# Patient Record
Sex: FEMALE | Race: ASIAN
[De-identification: names, ages, dates, MRNs, and addresses within clinical notes are randomized per-mention and may not be internally consistent; named-entity substitution may affect disease eponyms.]

---

## 2021-12-16 ENCOUNTER — HOSPITAL ENCOUNTER (EMERGENCY)
Dept: HOSPITAL 75 - ER | Age: 21
Discharge: HOME | End: 2021-12-16
Payer: SELF-PAY

## 2021-12-16 VITALS — WEIGHT: 110.23 LBS | BODY MASS INDEX: 20.81 KG/M2 | HEIGHT: 61.02 IN

## 2021-12-16 VITALS — SYSTOLIC BLOOD PRESSURE: 105 MMHG | DIASTOLIC BLOOD PRESSURE: 66 MMHG

## 2021-12-16 VITALS — SYSTOLIC BLOOD PRESSURE: 98 MMHG | DIASTOLIC BLOOD PRESSURE: 66 MMHG

## 2021-12-16 DIAGNOSIS — D62: ICD-10-CM

## 2021-12-16 DIAGNOSIS — O03.9: Primary | ICD-10-CM

## 2021-12-16 DIAGNOSIS — Z3A.00: ICD-10-CM

## 2021-12-16 DIAGNOSIS — N93.9: ICD-10-CM

## 2021-12-16 LAB
BASOPHILS # BLD AUTO: 0 10^3/UL (ref 0–0.1)
BASOPHILS NFR BLD AUTO: 0 % (ref 0–10)
EOSINOPHIL # BLD AUTO: 0.2 10^3/UL (ref 0–0.3)
EOSINOPHIL NFR BLD AUTO: 3 % (ref 0–10)
HCT VFR BLD CALC: 28 % (ref 35–52)
HCT VFR BLD CALC: 34 % (ref 35–52)
HGB BLD-MCNC: 11.7 G/DL (ref 11.5–16)
HGB BLD-MCNC: 9.8 G/DL (ref 11.5–16)
LYMPHOCYTES # BLD AUTO: 2 10^3/UL (ref 1–4)
LYMPHOCYTES NFR BLD AUTO: 24 % (ref 12–44)
MANUAL DIFFERENTIAL PERFORMED BLD QL: NO
MCH RBC QN AUTO: 30 PG (ref 25–34)
MCHC RBC AUTO-ENTMCNC: 35 G/DL (ref 32–36)
MCV RBC AUTO: 88 FL (ref 80–99)
MONOCYTES # BLD AUTO: 0.7 10^3/UL (ref 0–1)
MONOCYTES NFR BLD AUTO: 9 % (ref 0–12)
NEUTROPHILS # BLD AUTO: 5.5 10^3/UL (ref 1.8–7.8)
NEUTROPHILS NFR BLD AUTO: 65 % (ref 42–75)
PLATELET # BLD: 251 10^3/UL (ref 130–400)
PMV BLD AUTO: 8.6 FL (ref 9–12.2)
WBC # BLD AUTO: 8.5 10^3/UL (ref 4.3–11)

## 2021-12-16 PROCEDURE — 76801 OB US < 14 WKS SINGLE FETUS: CPT

## 2021-12-16 PROCEDURE — 76817 TRANSVAGINAL US OBSTETRIC: CPT

## 2021-12-16 PROCEDURE — 85018 HEMOGLOBIN: CPT

## 2021-12-16 PROCEDURE — 86900 BLOOD TYPING SEROLOGIC ABO: CPT

## 2021-12-16 PROCEDURE — 84702 CHORIONIC GONADOTROPIN TEST: CPT

## 2021-12-16 PROCEDURE — 85025 COMPLETE CBC W/AUTO DIFF WBC: CPT

## 2021-12-16 PROCEDURE — 86901 BLOOD TYPING SEROLOGIC RH(D): CPT

## 2021-12-16 PROCEDURE — 36415 COLL VENOUS BLD VENIPUNCTURE: CPT

## 2021-12-16 PROCEDURE — 85014 HEMATOCRIT: CPT

## 2021-12-16 NOTE — CONSULTATION
History of Present Illness


History of Present Illness


Patient Consulted On(geoffrey/time)


12/16/21


 12:11


Date Seen by Provider:  Dec 16, 2021


Time Seen by Provider:  04:45


Reason for Visit:  Miscarriage


History of Present Illness


Patient presented to ER with concerns of miscarriage at home.  Unsure of lmp.  

She said that she had passed some tissue and bleeding heavy at home.  I was con

tacted by ER physician due to tissue stuck in cervical os.  Reports hgb was 12 

at arrival.





Allergies and Home Medications


Allergies


Coded Allergies:  


     No Known Drug Allergies (Unverified , 12/16/21)





Patient Home Medication List


Home Medication List Reviewed:  Yes


No Active Prescriptions or Reported Meds





Past Medical-Social-Family Hx


Patient Social History


Substance use?:  No


Alcohol Use?:  Yes


Alcohol Frequency:  Once in a while


Pt feels they are or have been:  No





Past Medical History


Surgery/Hospitalization HX:  


denies


Surgeries:  No


Respiratory:  No


Cardiac:  No


Neurological:  No


Last Menstrual Period:  Sep 23, 2021


Genitourinary:  No


Gastrointestinal:  No


Musculoskeletal:  No


Endocrine:  No


HEENT:  No


Cancer:  No


Psychosocial:  No


Integumentary:  No


Blood Disorders:  No





Review of Systems-General


Constitutional:  see HPI


EENTM:  see HPI


Respiratory:  see HPI


Cardiovascular:  see HPI


Genitourinary:  see HPI


Pregnant:  Yes


LMP:  Nov 5, 2021


Musculoskeletal:  see HPI


Skin:  see HPI


Psychiatric/Neurological:  See HPI


All Other Systems Reviewed


Negative Unless Noted:  Yes





Physical Exam-General Problems


Physical Exam


Vital Signs





Vital Signs - First Documented








 12/16/21





 04:36


 


Temp 36.9


 


Pulse 76


 


Resp 16


 


B/P (MAP) 105/65 (78)


 


Pulse Ox 100


 


O2 Delivery Room Air





Capillary Refill : Less Than 3 Seconds


General Appearance:  moderate distress


HEENT:  PERRL/EOMI


Neck:  non-tender


Respiratory:  chest non-tender


Cardiovascular:  regular rate, rhythm


Comments


Patient in lithotomy position in the ER room 7.  Speculum used to identify POC 

likely placental tissue at the cervix, which is teased out using a ring forcep. 

Bleeding significantly subsides after this.





Assessment/Plan


Assessment/Plan


Admission Diagnosis/Plan


Diagnosis:


22 yo with likely complete SAB


Acute vaginal hemorrhage





P:


Pelvic US ordered and if WNL will plan for dismissal from ER with follow up 

outpatient


Cytotec 600 mcg PO given at 5am and 9 am to help control and stabilize bleeding


Will need follow up out patient to follow bhcg levels.


Admission Status:  Other (Outpt Proc)











FER RUFF DO            Dec 16, 2021 12:33

## 2021-12-16 NOTE — ED GU-FEMALE
General


Chief Complaint:  OB < 20 WEEKS


Stated Complaint:  MISCARRIAGE


Source:  patient (VAGUE,LIMITED HISTORIAN)


 (LENORE JIMENEZ DO)





History of Present Illness


Date Seen by Provider:  Dec 16, 2021


Time Seen by Provider:  04:36


Initial Comments


PT ARRIVES VIA EMS FROM HOME


PT THINKS SHE IS ABOUT 3 MONTHS PREGNANT--CANNOT STATE WHEN LMP WAS


GOT UP TO BATHROOM AT 0300, AND HAS HAD VAGINAL BLEEDING--JUST SAT ON TOILET FOR

AN HOUR, BLEEDING INTO TOILET, AND CALLED EMS. 


HAS NOT USED ANY PADS


NO ABDOMINAL PAIN, BUT DOES HAVE A LOWER BACK ACHE


NO NAUSEA/VOMITING


NO FEVER





NO OB CARE





PT IS --FIRST PREGNANCY WAS FULL TERM, NORMAL PREGNANCY AND DELIVERY 

WITHOUT COMPLICATIONS





PT JUST CAME HERE LESS THAN 2 MONTHS AGO FROM [facility]. 


HAS NOT HAD COVID VACCINE





PCP: NONE


 (LENORE JIMENEZ DO)





Allergies and Home Medications


Allergies


Coded Allergies:  


     No Known Drug Allergies (Unverified , 21)





Patient Home Medication List


Home Medication List Reviewed:  Yes


 (JOSE GUADALUPE CHAO MD)


No Active Prescriptions or Reported Meds





Review of Systems


Review of Systems


Constitutional:  no symptoms reported


Respiratory:  no symptoms reported


Cardiovascular:  no symptoms reported


Gastrointestinal:  no symptoms reported


Genitourinary:  see HPI


Pregnant:  Yes


Musculoskeletal:  see HPI, back pain


Skin:  no symptoms reported


Psychiatric/Neurological:  No Symptoms Reported (LENORE JIMENEZ DO)





Past Medical-Social-Family Hx


Patient Social History


Tobacco Use?:  No


Substance use?:  No


Alcohol Use?:  Yes


Alcohol Frequency:  Once in a while


Pt feels they are or have been:  No


 (LENORE JIMENEZ DO)





Past Medical History


Surgery/Hospitalization HX:  


denies


Surgeries:  No


Respiratory:  No


Cardiac:  No


Neurological:  No


Genitourinary:  No


Gastrointestinal:  No


Musculoskeletal:  No


Endocrine:  No


HEENT:  No


Cancer:  No


Psychosocial:  No


Integumentary:  No


Blood Disorders:  No


 (LENORE JIMENEZ DO)





Physical Exam


Vital Signs





Vital Signs - First Documented








 21





 04:36


 


Temp 36.9


 


Pulse 76


 


Resp 16


 


B/P (MAP) 105/65 (78)


 


Pulse Ox 100


 


O2 Delivery Room Air





 (JOSE GUADALUPE CHAO MD)


Vital Signs


Capillary Refill :  


 (LENORE JIMENEZ DO)


Height, Weight, BMI


Height: '"


Weight: lbs. oz. kg;  BMI


Method:


General Appearance:  WD/WN, no apparent distress, thin (VERY PETITE), other 

(FLAT AFFECT. DOES NOT APPEAR  TO BE IN ANY DISCOMFORT OR DISTRESS)


HEENT:  PERRL/EOMI; No pale conjunctivae (R), No pale conjunctivae (L)


Neck:  normal inspection


Cardiovascular:  regular rate, rhythm, no murmur


Respiratory:  normal breath sounds


Gastrointestinal:  non tender, soft, other (ABDOMEN IS COMPLETELY FLAT)


Pelvic:  other (PT WITH ACTIVE BLEEDING, LARGE CLOT JUST PROTRUDING FROM VAGINA.

MASSIVE CLOT AT LEAST 10 CM IN DIAMETER PASSED WHEN SPECULUM PLACE. THERE IS A 

LARGE PIECE OF TISSUE PROTRUDING FROM CERVIX, AND IS RESTING JUST OUTSIDE OF 

INTROITUS--HAS APPEARANCE OF PRODUCTS OF CONCEPTION--HAS APPEARANCE OF 

PLACENTAL-TYPE TISSUE. STILL WITH MODERATE ACTIVE BLEEDING AND PASSING PALM-

SIZED CLOTS. CERVIX IS DILATED TO 3 CM)


Back:  normal inspection


Extremities:  normal inspection


Neurologic/Psychiatric:  no motor/sensory deficits, alert, oriented x 3


Skin:  normal color (DARK SKINNED), warm/dry (LENORE JIMENEZ DO)





Progress/Results/Core Measures


Suspected Sepsis


SIRS


Temperature: 


Pulse:  


Respiratory Rate: 


 


Laboratory Tests


21 04:45: White Blood Count 8.5


Blood Pressure  / 


Mean: 


 





Laboratory Tests


21 04:45: Platelet Count 251


 (LENORE JIMENEZ DO)





Results/Orders


Lab Results





Laboratory Tests








Test


 21


04:45 21


08:12 Range/Units


 


 


White Blood Count


 8.5 


 


 4.3-11.0


10^3/uL


 


Red Blood Count


 3.86 


 


 3.80-5.11


10^6/uL


 


Hemoglobin 11.7  9.8 L 11.5-16.0  g/dL


 


Hematocrit 34 L 28 L 35-52  %


 


Mean Corpuscular Volume 88   80-99  fL


 


Mean Corpuscular Hemoglobin 30   25-34  pg


 


Mean Corpuscular Hemoglobin


Concent 35 


 


 32-36  g/dL





 


Red Cell Distribution Width 11.4   10.0-14.5  %


 


Platelet Count


 251 


 


 130-400


10^3/uL


 


Mean Platelet Volume 8.6 L  9.0-12.2  fL


 


Immature Granulocyte % (Auto) 0    %


 


Neutrophils (%) (Auto) 65   42-75  %


 


Lymphocytes (%) (Auto) 24   12-44  %


 


Monocytes (%) (Auto) 9   0-12  %


 


Eosinophils (%) (Auto) 3   0-10  %


 


Basophils (%) (Auto) 0   0-10  %


 


Neutrophils # (Auto)


 5.5 


 


 1.8-7.8


10^3/uL


 


Lymphocytes # (Auto)


 2.0 


 


 1.0-4.0


10^3/uL


 


Monocytes # (Auto)


 0.7 


 


 0.0-1.0


10^3/uL


 


Eosinophils # (Auto)


 0.2 


 


 0.0-0.3


10^3/uL


 


Basophils # (Auto)


 0.0 


 


 0.0-0.1


10^3/uL


 


Immature Granulocyte # (Auto)


 0.0 


 


 0.0-0.1


10^3/uL


 


Human Chorionic Gonadotropin,


Quant 3077 H


 


 <5  MIU/ML








 (JOSE GUADALUPE CHAO MD)


My Orders





Orders - JOSE GUADALUPE CHAO MD


Hemoglobin And Hematocrit (21 07:45)


Misoprostol Tablet (Cytotec Tablet) (21 08:47)


 (JOSE GUADALUPE CHAO MD)


Medications Given in ED


 (JOSE GUADALUPE CHAO MD)


Vital Signs/I&O











 21





 10:42


 


Pulse 77


 


Resp 16


 


B/P (MAP) 105/66


 


Pulse Ox 98


 


O2 Delivery Room Air





 (JOSE GUADALUPE CHAO MD)


Vital Signs/I&O


Capillary Refill :  


 (LENORE JIMENEZ DO)


Progress Note :  


Progress Note


GIVEN IV FLUIDS





PRODUCTS THAT ARE PROTRUDING FROM CERVIX HAVE ADVANCED TO JUST OUTSIDE OF 

INTROITUS, BUT THEN STOPPED ADVANCING, DESPITE PT ACTIVELY PUSHING AND GENTLE 

TRACTION AND SWEEPING OF CERVIX. ACTIVE BLEEDING CONTINUES. 





DR. RUFF CONTACTED, AND ARRIVED SHORTLY AFTER, AND WAS ABLE TO MANUALLY 

EXTRACT THE PRODUCTS AND ACTIVE BLEEDING IMMEDIATELY STOPPED. 


PRODUCTS SENT TO LAB FOR PATHOLOGY





CYTOTEC GIVEN PER DR. RUFF'S INSTRUCTIONS





0600--CERVIX VISUALIZED WITH SPECULUM, AND HAS ONLY A VERY SLOW TRICKLE OF 

BLOOD. PT STATES SHE IS NOT HAVING ANY CRAMPING AT THIS TIME. 


BP DROPPED BRIEFLY TO 70'S SYSTOLIC, BUT QUICKLY BACK UP TO 95 SYSTOLIC BEFORE 

ADDITIONAL FLUIDS WERE GIVEN. 


PT DID GET DIZZY ON STANDING, BUT NO DROP IN BP. 





WILL OBTAIN ULTRASOUND AND CALL DR. RUFF WITH RESULTS. 





0630--CARE TURNED OVER TO DR. CHAO. 


 (LENORE JIMENEZ DO)


Progress Note :  


   Time:  08:36


Progress Note


I assumed care of this patient from Dr. Jimenez at shift change.  She has received 

2 L of IV fluid.  She is no longer dizzy upon standing and systolic blood 

pressure on standing was 96.  A repeat hemoglobin after fluids was 9.8.  Patient

denies any pain.  Bleeding seems to have slowed considerably.  Dr. RUFF return

to the ER to inquire about patient status.  He recommends an additional dose of 

Cytotec at 0900 and discharge if she is stable.


 (JOSE GUADALUPE CHAO MD)





Diagnostic Imaging





   Diagonstic Imaging:  Ultrasound


   Plain Films/CT/US/NM/MRI:  pelvis


Comments


Ultrasound report reviewed.  See report below:





NAME:   TALITA CAMEJO


[facility] REC#:   [id number]


ACCOUNT#:   [id number]


PT STATUS:   REG ER


:   2000


PHYSICIAN:   LENOER JIMENEZ DO


ADMIT DATE:   21/ER


***Signed***


Date of Exam:21





US OB<14 WKS SNGLE W/TRANSVAG








PROCEDURE:  Pelvic complete, transabdominal and transvaginal


sonogram. Limited pelvic doppler.





TECHNIQUE: Multiple real-time grayscale images were obtained of


the pelvis in various projections transabdominally and


transvaginally. Limited pelvic duplex images were obtained.





HISTORY: Miscarriage





COMPARISON: None available.





FINDINGS: 





Uterus: The uterus is anteverted and measures 8.2 x 5.0 x 5.9 cm.


The myometrium is homogeneous without fibroids.





Endometrium: The endometrium is increased in thickness and


measures 2.3 cm. There is no fluid within the endometrial cavity.


No intrauterine gestational sac is seen.





Adnexa: Both ovaries have a normal physiologic appearance. The


right ovary measures 2.8 x 1.8 x 1.7 cm and the left ovary


measures 2.5 x 1.4 x 1.5 cm. Duplex images reveal normal vascular


flow to both ovaries. 





Other: There is no free fluid within the pelvis.





IMPRESSION:





1. Thickened appearance of the endometrium without visualized


gestational sac. Findings could be seen with early pregnancy,


failed pregnancy, or nonvisualized ectopic pregnancy. Recommend


correlation with beta hCG and follow-up ultrasound as clinically


indicated.





Dictated by: 





  Dictated on workstation # [id number]








Dict:   21


Trans:   21


[facility] [id number]





Interpreted by:     MARION CAMEJO DO


Electronically signed by: MARION CAMEJO DO 21


 (JOSE GUADALUPE CHAO MD)





Departure


Communication (Admissions)


0526--SPOKE WITH DR. RUFF, WILL BE IN TO SEE PT


0535--DR RUFF HERE TO SEE PT. 


 (LENORE JIMENEZ DO)





Impression





   Primary Impression:  


   Spontaneous miscarriage


   Additional Impressions:  


   Vaginal hemorrhage


   Acute blood loss anemia


Disposition:   HOME, SELF-CARE


Condition:  Improved





Departure-Patient Inst.


Decision time for Depature:  10:32


 (JOSE GUADALUPE CHAO MD)


Referrals:  


UNKNOWN (PCP)


Primary Care Physician








FER RUFF DO


Patient Instructions:  Miscarriage





Add. Discharge Instructions:  


Drink plenty of fluids and eat a well-balanced diet.  In particular, eat foods 

high in iron such as red meats and leafy green vegetables.  This will help 

replenish your blood loss from bleeding.  You should also take a multivitamin or

prenatal vitamin daily.


If you develop secondary symptoms of severe anemia such as shortness of breath, 

lightheadedness, weakness, etc. please return to care for further evaluation.


You should expect some continued bleeding and cramping for the next several 

days.  Return to the ER if the symptoms become more severe or are escalating.


Also return to the ER if you develop new symptoms or problems such as fever.


Call Dr. RUFF to arrange a follow-up appointment.  His phone number is below.


Call the ER or Dr. RUFF's office with questions or concerns.





All discharge instructions reviewed with patient and/or family. Voiced 

understanding.


Scripts


No Active Prescriptions or Reported Meds





Copy


Copies To 1:   FER RUFF LISA K DO                 Dec 16, 2021 04:46


JOSE GUADALUPE CHAO MD        Dec 16, 2021 08:39

## 2021-12-16 NOTE — DIAGNOSTIC IMAGING REPORT
PROCEDURE:  Pelvic complete, transabdominal and transvaginal

sonogram. Limited pelvic doppler.



TECHNIQUE: Multiple real-time grayscale images were obtained of

the pelvis in various projections transabdominally and

transvaginally. Limited pelvic duplex images were obtained.



HISTORY: Miscarriage



COMPARISON: None available.



FINDINGS: 



Uterus: The uterus is anteverted and measures 8.2 x 5.0 x 5.9 cm.

The myometrium is homogeneous without fibroids.



Endometrium: The endometrium is increased in thickness and

measures 2.3 cm. There is no fluid within the endometrial cavity.

No intrauterine gestational sac is seen.



Adnexa: Both ovaries have a normal physiologic appearance. The

right ovary measures 2.8 x 1.8 x 1.7 cm and the left ovary

measures 2.5 x 1.4 x 1.5 cm. Duplex images reveal normal vascular

flow to both ovaries. 



Other: There is no free fluid within the pelvis.



IMPRESSION:



1. Thickened appearance of the endometrium without visualized

gestational sac. Findings could be seen with early pregnancy,

failed pregnancy, or nonvisualized ectopic pregnancy. Recommend

correlation with beta hCG and follow-up ultrasound as clinically

indicated.



Dictated by: 



  Dictated on workstation # [id number]